# Patient Record
Sex: FEMALE | Race: WHITE | NOT HISPANIC OR LATINO | Employment: FULL TIME | URBAN - METROPOLITAN AREA
[De-identification: names, ages, dates, MRNs, and addresses within clinical notes are randomized per-mention and may not be internally consistent; named-entity substitution may affect disease eponyms.]

---

## 2017-10-06 ENCOUNTER — OFFICE VISIT (OUTPATIENT)
Dept: INTERNAL MEDICINE | Facility: CLINIC | Age: 22
End: 2017-10-06
Payer: COMMERCIAL

## 2017-10-06 ENCOUNTER — LAB VISIT (OUTPATIENT)
Dept: LAB | Facility: HOSPITAL | Age: 22
End: 2017-10-06
Attending: INTERNAL MEDICINE
Payer: COMMERCIAL

## 2017-10-06 VITALS
TEMPERATURE: 96 F | BODY MASS INDEX: 17.72 KG/M2 | HEART RATE: 57 BPM | DIASTOLIC BLOOD PRESSURE: 60 MMHG | WEIGHT: 112.88 LBS | HEIGHT: 67 IN | OXYGEN SATURATION: 96 % | SYSTOLIC BLOOD PRESSURE: 100 MMHG

## 2017-10-06 DIAGNOSIS — F50.9 EATING DISORDER: ICD-10-CM

## 2017-10-06 DIAGNOSIS — R63.6 UNDERWEIGHT: ICD-10-CM

## 2017-10-06 DIAGNOSIS — R63.1 EXCESSIVE THIRST: ICD-10-CM

## 2017-10-06 DIAGNOSIS — Z86.19 HISTORY OF LYME DISEASE: ICD-10-CM

## 2017-10-06 DIAGNOSIS — E03.9 HYPOTHYROIDISM, UNSPECIFIED TYPE: ICD-10-CM

## 2017-10-06 DIAGNOSIS — F41.9 ANXIETY: ICD-10-CM

## 2017-10-06 DIAGNOSIS — Z00.00 ROUTINE GENERAL MEDICAL EXAMINATION AT A HEALTH CARE FACILITY: ICD-10-CM

## 2017-10-06 DIAGNOSIS — Z00.00 ROUTINE GENERAL MEDICAL EXAMINATION AT A HEALTH CARE FACILITY: Primary | ICD-10-CM

## 2017-10-06 LAB
25(OH)D3+25(OH)D2 SERPL-MCNC: 35 NG/ML
ALBUMIN SERPL BCP-MCNC: 4.9 G/DL
ALP SERPL-CCNC: 62 U/L
ALT SERPL W/O P-5'-P-CCNC: 56 U/L
ANION GAP SERPL CALC-SCNC: 10 MMOL/L
AST SERPL-CCNC: 37 U/L
BASOPHILS # BLD AUTO: 0.02 K/UL
BASOPHILS NFR BLD: 0.5 %
BILIRUB SERPL-MCNC: 0.7 MG/DL
BUN SERPL-MCNC: 11 MG/DL
CALCIUM SERPL-MCNC: 10.1 MG/DL
CHLORIDE SERPL-SCNC: 94 MMOL/L
CHOLEST SERPL-MCNC: 150 MG/DL
CHOLEST/HDLC SERPL: 1.9 {RATIO}
CO2 SERPL-SCNC: 26 MMOL/L
CREAT SERPL-MCNC: 0.8 MG/DL
DIFFERENTIAL METHOD: ABNORMAL
EOSINOPHIL # BLD AUTO: 0 K/UL
EOSINOPHIL NFR BLD: 0.8 %
ERYTHROCYTE [DISTWIDTH] IN BLOOD BY AUTOMATED COUNT: 13 %
EST. GFR  (AFRICAN AMERICAN): >60 ML/MIN/1.73 M^2
EST. GFR  (NON AFRICAN AMERICAN): >60 ML/MIN/1.73 M^2
ESTIMATED AVG GLUCOSE: 97 MG/DL
FOLATE SERPL-MCNC: 9.6 NG/ML
GLUCOSE SERPL-MCNC: 78 MG/DL
HBA1C MFR BLD HPLC: 5 %
HCT VFR BLD AUTO: 39.5 %
HDLC SERPL-MCNC: 77 MG/DL
HDLC SERPL: 51.3 %
HGB BLD-MCNC: 13.2 G/DL
LDLC SERPL CALC-MCNC: 68.4 MG/DL
LYMPHOCYTES # BLD AUTO: 0.9 K/UL
LYMPHOCYTES NFR BLD: 23.8 %
MCH RBC QN AUTO: 30.8 PG
MCHC RBC AUTO-ENTMCNC: 33.4 G/DL
MCV RBC AUTO: 92 FL
MONOCYTES # BLD AUTO: 0.2 K/UL
MONOCYTES NFR BLD: 5.3 %
NEUTROPHILS # BLD AUTO: 2.6 K/UL
NEUTROPHILS NFR BLD: 69.6 %
NONHDLC SERPL-MCNC: 73 MG/DL
PLATELET # BLD AUTO: 213 K/UL
PMV BLD AUTO: 10 FL
POTASSIUM SERPL-SCNC: 4.4 MMOL/L
PROT SERPL-MCNC: 8.2 G/DL
RBC # BLD AUTO: 4.28 M/UL
SODIUM SERPL-SCNC: 130 MMOL/L
T4 FREE SERPL-MCNC: 0.86 NG/DL
TRIGL SERPL-MCNC: 23 MG/DL
TSH SERPL DL<=0.005 MIU/L-ACNC: 0.99 UIU/ML
VIT B12 SERPL-MCNC: 560 PG/ML
WBC # BLD AUTO: 3.74 K/UL

## 2017-10-06 PROCEDURE — 99385 PREV VISIT NEW AGE 18-39: CPT | Mod: S$GLB,,, | Performed by: INTERNAL MEDICINE

## 2017-10-06 PROCEDURE — 80061 LIPID PANEL: CPT

## 2017-10-06 PROCEDURE — 82746 ASSAY OF FOLIC ACID SERUM: CPT

## 2017-10-06 PROCEDURE — 82607 VITAMIN B-12: CPT

## 2017-10-06 PROCEDURE — 36415 COLL VENOUS BLD VENIPUNCTURE: CPT | Mod: PO

## 2017-10-06 PROCEDURE — 83036 HEMOGLOBIN GLYCOSYLATED A1C: CPT

## 2017-10-06 PROCEDURE — 85025 COMPLETE CBC W/AUTO DIFF WBC: CPT

## 2017-10-06 PROCEDURE — 84439 ASSAY OF FREE THYROXINE: CPT

## 2017-10-06 PROCEDURE — 84443 ASSAY THYROID STIM HORMONE: CPT

## 2017-10-06 PROCEDURE — 82306 VITAMIN D 25 HYDROXY: CPT

## 2017-10-06 PROCEDURE — 99999 PR PBB SHADOW E&M-NEW PATIENT-LVL III: CPT | Mod: PBBFAC,,, | Performed by: INTERNAL MEDICINE

## 2017-10-06 PROCEDURE — 80053 COMPREHEN METABOLIC PANEL: CPT

## 2017-10-06 RX ORDER — ACETAMINOPHEN AND PHENYLEPHRINE HCL 325; 5 MG/1; MG/1
1 TABLET ORAL DAILY
COMMUNITY

## 2017-10-06 RX ORDER — CYANOCOBALAMIN (VITAMIN B-12) 1000 MCG
1 TABLET, EXTENDED RELEASE ORAL DAILY
COMMUNITY

## 2017-10-06 RX ORDER — LIOTHYRONINE SODIUM 5 UG/1
5 TABLET ORAL DAILY
COMMUNITY
End: 2017-10-12

## 2017-10-06 RX ORDER — IBUPROFEN 100 MG/5ML
1000 SUSPENSION, ORAL (FINAL DOSE FORM) ORAL DAILY
COMMUNITY

## 2017-10-06 NOTE — PROGRESS NOTES
"Subjective:      Patient ID: Alaina Marie Abadie is a 21 y.o. female.    Chief Complaint: Establish Care    20 yo with Patient Active Problem List:     Hypothyroidism     Eating disorder  H/o lyme ds    Here today for annual exam. Declines flu vaccine. Has h/o anxiety since childhood. H/o eating d/o dx 11 or 11 y/o, orthorexia. Has had neg GYN exam. Reports h/o low thyroid tx with cytomel x 3 to 4 months. No cytomel x one week.       Review of Systems   Constitutional: Negative for chills, diaphoresis, fatigue and fever.   HENT: Negative for ear pain and sore throat.    Respiratory: Negative for cough.    Cardiovascular: Negative for chest pain.   Gastrointestinal: Negative for abdominal pain and blood in stool.   Genitourinary: Negative for dysuria and hematuria.   Neurological: Negative for seizures and syncope.   Psychiatric/Behavioral: Negative for dysphoric mood, hallucinations, self-injury and suicidal ideas. The patient is nervous/anxious.      Objective:   /60 (BP Location: Right arm, Patient Position: Sitting)   Pulse (!) 57   Temp 96.2 °F (35.7 °C) (Tympanic)   Ht 5' 7" (1.702 m)   Wt 51.2 kg (112 lb 14 oz)   LMP 01/10/2017 (Approximate)   SpO2 96%   BMI 17.68 kg/m²     Physical Exam   Constitutional: She is oriented to person, place, and time. No distress.   thin   HENT:   Head: Normocephalic and atraumatic.   Mouth/Throat: Oropharynx is clear and moist.   Eyes: EOM are normal. Pupils are equal, round, and reactive to light.   Neck: Neck supple. No thyromegaly present.   Cardiovascular: Normal rate and regular rhythm.    Pulmonary/Chest: Breath sounds normal. She has no wheezes. She has no rales.   Abdominal: Soft. Bowel sounds are normal. There is no tenderness.   Musculoskeletal: She exhibits no edema.   Lymphadenopathy:     She has no cervical adenopathy.   Neurological: She is alert and oriented to person, place, and time.   Skin: Skin is warm and dry.   Red dry hands fly   Psychiatric: " She has a normal mood and affect. Her behavior is normal.       Assessment:     1. Routine general medical examination at a health care facility    2. Hypothyroidism, unspecified type    3. Eating disorder    4. Underweight    5. Excessive thirst    6. History of Lyme disease    7. Anxiety      Plan:   Routine general medical examination at a health care facility  -     TSH; Future; Expected date: 10/06/2017  -     Lipid panel; Future; Expected date: 10/06/2017  -     CBC auto differential; Future; Expected date: 10/06/2017  -     Comprehensive metabolic panel; Future; Expected date: 10/06/2017  Healthy diet and regular exercise  Hm reviewed  Pt declined flu vaccine    Hypothyroidism, unspecified type  Check TFTs    Eating disorder  -     Vitamin D; Future; Expected date: 10/06/2017  -     Vitamin B12; Future; Expected date: 10/06/2017  -     Folate; Future; Expected date: 10/06/2017  -     Ambulatory referral to Psychiatry  -     Urinalysis; Future; Expected date: 10/06/2017    Underweight  -     Ambulatory referral to Psychiatry    Anxiety  After asking pt about red dry hands pt admits to excessive hand washing at times.    Hemoglobin A1c; Future; Expected date: 10/06/2017    Moisturize hands as discussed.     Lab Frequency Next Occurrence   TSH Once 10/06/2017   Lipid panel Once 10/06/2017   CBC auto differential Once 10/06/2017   Comprehensive metabolic panel Once 10/06/2017   Vitamin D Once 10/06/2017   Vitamin B12 Once 10/06/2017   Folate Once 10/06/2017   Urinalysis Once 10/06/2017   Hemoglobin A1c Once 10/06/2017       Problem List Items Addressed This Visit        Psychiatric    Eating disorder    Relevant Orders    Vitamin D (Completed)    Vitamin B12 (Completed)    Folate (Completed)    Ambulatory referral to Psychiatry    Urinalysis (Completed)       Endocrine    Hypothyroidism    Relevant Orders    T4, free (Completed)      Other Visit Diagnoses     Routine general medical examination at a health care  facility    -  Primary    Relevant Orders    TSH (Completed)    Lipid panel (Completed)    CBC auto differential (Completed)    Comprehensive metabolic panel (Completed)    Underweight        Relevant Orders    Ambulatory referral to Psychiatry    Excessive thirst        Relevant Orders    Hemoglobin A1c (Completed)    History of Lyme disease        Anxiety              Return in about 4 weeks (around 11/3/2017), or if symptoms worsen or fail to improve.

## 2017-10-24 ENCOUNTER — PATIENT OUTREACH (OUTPATIENT)
Dept: ADMINISTRATIVE | Facility: HOSPITAL | Age: 22
End: 2017-10-24

## 2017-11-10 ENCOUNTER — OFFICE VISIT (OUTPATIENT)
Dept: INTERNAL MEDICINE | Facility: CLINIC | Age: 22
End: 2017-11-10
Payer: COMMERCIAL

## 2017-11-10 VITALS
TEMPERATURE: 96 F | SYSTOLIC BLOOD PRESSURE: 96 MMHG | BODY MASS INDEX: 18.51 KG/M2 | HEART RATE: 47 BPM | HEIGHT: 67 IN | DIASTOLIC BLOOD PRESSURE: 64 MMHG | WEIGHT: 117.94 LBS | OXYGEN SATURATION: 99 %

## 2017-11-10 DIAGNOSIS — Z86.39 HISTORY OF HYPOTHYROIDISM: Primary | ICD-10-CM

## 2017-11-10 DIAGNOSIS — F50.9 EATING DISORDER: ICD-10-CM

## 2017-11-10 DIAGNOSIS — L30.9 DERMATITIS: ICD-10-CM

## 2017-11-10 PROCEDURE — 99213 OFFICE O/P EST LOW 20 MIN: CPT | Mod: S$GLB,,, | Performed by: INTERNAL MEDICINE

## 2017-11-10 PROCEDURE — 99999 PR PBB SHADOW E&M-EST. PATIENT-LVL III: CPT | Mod: PBBFAC,,, | Performed by: INTERNAL MEDICINE

## 2017-11-10 NOTE — PROGRESS NOTES
"Subjective:      Patient ID: Alaina Marie Abadie is a 21 y.o. female.    Chief Complaint: Follow-up    20 yo with Patient Active Problem List:     Hypothyroidism     Eating disorder    Here today for f/u on below and lab review.  Feeling well and in usual state of health. Has been seeing SW but planning to establish with psyc.       Review of Systems   Constitutional: Negative for activity change, chills, fever and unexpected weight change.   HENT: Negative for hearing loss, rhinorrhea and trouble swallowing.    Eyes: Negative for discharge and visual disturbance.   Respiratory: Negative for cough, chest tightness and wheezing.    Cardiovascular: Negative for chest pain and palpitations.   Gastrointestinal: Negative for abdominal pain, blood in stool, constipation, diarrhea and vomiting.   Endocrine: Negative for polydipsia and polyuria.   Genitourinary: Positive for menstrual problem. Negative for difficulty urinating, dysuria and hematuria.   Musculoskeletal: Negative for arthralgias, joint swelling and neck pain.   Neurological: Negative for weakness and headaches.   Psychiatric/Behavioral: Negative for confusion and dysphoric mood.     Objective:   BP 96/64 (BP Location: Right arm, Patient Position: Sitting)   Pulse (!) 47   Temp 96.3 °F (35.7 °C) (Tympanic)   Ht 5' 7" (1.702 m)   Wt 53.5 kg (117 lb 15.1 oz)   LMP  (LMP Unknown)   SpO2 99%   BMI 18.47 kg/m²     Physical Exam   Constitutional: She appears well-developed and well-nourished. No distress.   Cardiovascular: Normal rate.    Pulmonary/Chest: Effort normal.   Neurological: She is alert.   Skin: Skin is warm and dry.   Psychiatric: She has a normal mood and affect. Her behavior is normal.     No visits with results within 2 Week(s) from this visit.   Latest known visit with results is:   Lab Visit on 10/06/2017   Component Date Value Ref Range Status    TSH 10/06/2017 0.991  0.400 - 4.000 uIU/mL Final    Cholesterol 10/06/2017 150  120 - 199 " mg/dL Final    Comment: The National Cholesterol Education Program (NCEP) has set the  following guidelines (reference ranges) for Cholesterol:  Optimal.....................<200 mg/dL  Borderline High.............200-239 mg/dL  High........................> or = 240 mg/dL      Triglycerides 10/06/2017 23* 30 - 150 mg/dL Final    Comment: The National Cholesterol Education Program (NCEP) has set the  following guidelines (reference values) for triglycerides:  Normal......................<150 mg/dL  Borderline High.............150-199 mg/dL  High........................200-499 mg/dL      HDL 10/06/2017 77* 40 - 75 mg/dL Final    Comment: The National Cholesterol Education Program (NCEP) has set the  following guidelines (reference values) for HDL Cholesterol:  Low...............<40 mg/dL  Optimal...........>60 mg/dL      LDL Cholesterol 10/06/2017 68.4  63.0 - 159.0 mg/dL Final    Comment: The National Cholesterol Education Program (NCEP) has set the  following guidelines (reference values) for LDL Cholesterol:  Optimal.......................<130 mg/dL  Borderline High...............130-159 mg/dL  High..........................160-189 mg/dL  Very High.....................>190 mg/dL      HDL/Chol Ratio 10/06/2017 51.3* 20.0 - 50.0 % Final    Total Cholesterol/HDL Ratio 10/06/2017 1.9* 2.0 - 5.0 Final    Non-HDL Cholesterol 10/06/2017 73  mg/dL Final    Comment: Risk category and Non-HDL cholesterol goals:  Coronary heart disease (CHD)or equivalent (10-year risk of CHD >20%):  Non-HDL cholesterol goal     <130 mg/dL  Two or more CHD risk factors and 10-year risk of CHD <= 20%:  Non-HDL cholesterol goal     <160 mg/dL  0 to 1 CHD risk factor:  Non-HDL cholesterol goal     <190 mg/dL      WBC 10/06/2017 3.74* 3.90 - 12.70 K/uL Final    RBC 10/06/2017 4.28  4.00 - 5.40 M/uL Final    Hemoglobin 10/06/2017 13.2  12.0 - 16.0 g/dL Final    Hematocrit 10/06/2017 39.5  37.0 - 48.5 % Final    MCV 10/06/2017 92  82 - 98  fL Final    MCH 10/06/2017 30.8  27.0 - 31.0 pg Final    MCHC 10/06/2017 33.4  32.0 - 36.0 g/dL Final    RDW 10/06/2017 13.0  11.5 - 14.5 % Final    Platelets 10/06/2017 213  150 - 350 K/uL Final    MPV 10/06/2017 10.0  9.2 - 12.9 fL Final    Gran # 10/06/2017 2.6  1.8 - 7.7 K/uL Final    Lymph # 10/06/2017 0.9* 1.0 - 4.8 K/uL Final    Mono # 10/06/2017 0.2* 0.3 - 1.0 K/uL Final    Eos # 10/06/2017 0.0  0.0 - 0.5 K/uL Final    Baso # 10/06/2017 0.02  0.00 - 0.20 K/uL Final    Gran% 10/06/2017 69.6  38.0 - 73.0 % Final    Lymph% 10/06/2017 23.8  18.0 - 48.0 % Final    Mono% 10/06/2017 5.3  4.0 - 15.0 % Final    Eosinophil% 10/06/2017 0.8  0.0 - 8.0 % Final    Basophil% 10/06/2017 0.5  0.0 - 1.9 % Final    Differential Method 10/06/2017 Automated   Final    Sodium 10/06/2017 130* 136 - 145 mmol/L Final    Potassium 10/06/2017 4.4  3.5 - 5.1 mmol/L Final    Chloride 10/06/2017 94* 95 - 110 mmol/L Final    CO2 10/06/2017 26  23 - 29 mmol/L Final    Glucose 10/06/2017 78  70 - 110 mg/dL Final    BUN, Bld 10/06/2017 11  6 - 20 mg/dL Final    Creatinine 10/06/2017 0.8  0.5 - 1.4 mg/dL Final    Calcium 10/06/2017 10.1  8.7 - 10.5 mg/dL Final    Total Protein 10/06/2017 8.2  6.0 - 8.4 g/dL Final    Albumin 10/06/2017 4.9  3.5 - 5.2 g/dL Final    Total Bilirubin 10/06/2017 0.7  0.1 - 1.0 mg/dL Final    Comment: For infants and newborns, interpretation of results should be based  on gestational age, weight and in agreement with clinical  observations.  Premature Infant recommended reference ranges:  Up to 24 hours.............<8.0 mg/dL  Up to 48 hours............<12.0 mg/dL  3-5 days..................<15.0 mg/dL  6-29 days.................<15.0 mg/dL      Alkaline Phosphatase 10/06/2017 62  55 - 135 U/L Final    AST 10/06/2017 37  10 - 40 U/L Final    ALT 10/06/2017 56* 10 - 44 U/L Final    Anion Gap 10/06/2017 10  8 - 16 mmol/L Final    eGFR if African American 10/06/2017 >60.0  >60  mL/min/1.73 m^2 Final    eGFR if non African American 10/06/2017 >60.0  >60 mL/min/1.73 m^2 Final    Comment: Calculation used to obtain the estimated glomerular filtration  rate (eGFR) is the CKD-EPI equation. Since race is unknown   in our information system, the eGFR values for   -American and Non--American patients are given   for each creatinine result.      Vit D, 25-Hydroxy 10/06/2017 35  30 - 96 ng/mL Final    Comment: Vitamin D deficiency.........<10 ng/mL                              Vitamin D insufficiency......10-29 ng/mL       Vitamin D sufficiency........> or equal to 30 ng/mL  Vitamin D toxicity............>100 ng/mL      Vitamin B-12 10/06/2017 560  210 - 950 pg/mL Final    Folate 10/06/2017 9.6  4.0 - 24.0 ng/mL Final    Hemoglobin A1C 10/06/2017 5.0  4.0 - 5.6 % Final    Comment: According to ADA guidelines, hemoglobin A1c <7.0% represents  optimal control in non-pregnant diabetic patients. Different  metrics may apply to specific patient populations.   Standards of Medical Care in Diabetes-2016.  For the purpose of screening for the presence of diabetes:  <5.7%     Consistent with the absence of diabetes  5.7-6.4%  Consistent with increasing risk for diabetes   (prediabetes)  >or=6.5%  Consistent with diabetes  Currently, no consensus exists for use of hemoglobin A1c  for diagnosis of diabetes for children.  This Hemoglobin A1c assay has significant interference with fetal   hemoglobin   (HbF). The results are invalid for patients with abnormal amounts of   HbF,   including those with known Hereditary Persistence   of Fetal Hemoglobin. Heterozygous hemoglobin variants (HbAS, HbAC,   HbAD, HbAE, HbA2) do not significantly interfere with this assay;   however, presence of multiple variants in a sample may impact the %   interference.      Estimated Avg Glucose 10/06/2017 97  68 - 131 mg/dL Final    Free T4 10/06/2017 0.86  0.71 - 1.51 ng/dL Final       Assessment:     1. History  of hypothyroidism    2. Eating disorder    3. Dermatitis      Plan:   History of hypothyroidism  -     TSH; Future; Expected date: 02/10/2018  -     Cancel: T4, free; Future; Expected date: 11/24/2017  -     T4, free; Future; Expected date: 02/10/2018    Eating disorder  Comments:  continue counseling  see psyc as discussed.    Dermatitis  Comments:  improving with mosturizing hands.        Lab Frequency Next Occurrence   TSH Once 02/10/2018   T4, free Once 11/24/2017       Problem List Items Addressed This Visit        Psychiatric    Eating disorder      Other Visit Diagnoses     History of hypothyroidism    -  Primary    Relevant Orders    TSH    T4, free    Dermatitis        improving with mosturizing hands.          Return in about 1 year (around 11/10/2018), or if symptoms worsen or fail to improve.

## 2017-11-16 ENCOUNTER — PATIENT MESSAGE (OUTPATIENT)
Dept: INTERNAL MEDICINE | Facility: CLINIC | Age: 22
End: 2017-11-16

## 2018-01-19 ENCOUNTER — NURSE TRIAGE (OUTPATIENT)
Dept: ADMINISTRATIVE | Facility: CLINIC | Age: 23
End: 2018-01-19

## 2018-01-19 NOTE — TELEPHONE ENCOUNTER
"    Reason for Disposition   [1] Influenza EXPOSURE (close contact) within the last 7 days AND [2] fever or any respiratory symptoms (i.e., cough, runny or stuffy nose, sore throat)   [1] Probable influenza (fever) with no complications AND [2] NOT HIGH RISK (all triage questions negative)     Low risk patientNamrata states she began having flu symptoms Tuesday.  No difficulty breathing at rest, no chest pain, and fever low grade 100.4. Cough, sore throat, fatigue, achy, runny nose.  Said she was exposed last week, and has been home from work due to the weather, so no exposure to others this week.  Home care advice given, with request to call back for worsening symptoms, claudia difficulty breathing, fever lasting > 3 days, chest pain, or other concerns.  She states she will.  Message to Manjeet Caputo, PCP. Please contact caller directly with any additional care advice.    Answer Assessment - Initial Assessment Questions  1. 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    2. DATE of EXPOSURE: "When did the exposure occur?" (e.g., hour, days, weeks)      Monday.    3. PREGNANCY: "Is there any chance you are pregnant?" "When was your last menstrual period?"      No. N/a    4. HIGH RISK for COMPLICATIONS: "Do you have any heart or lung problems? Do you have a weakened immune system?" (e.g., CHF, COPD, asthma, HIV positive, chemotherapy, renal failure, diabetes mellitus, sickle cell anemia)      No to all.    5. SYMPTOMS: "Do you have any symptoms?" (e.g., cough, fever, sore throat, difficulty breathing).      Cough, sore throat, tired, achy, 100.0 temp.    Answer Assessment - Initial Assessment Questions  1. WORST SYMPTOM: "What is your worst symptom?" (e.g., cough, runny nose, muscle aches, headache, sore throat, fever)       Congested with cough.    2. ONSET: "When did your flu symptoms start?"     Tuesday.    3. COUGH: "How bad " "is the cough?"        Coughing with any exercise or movement.    4. RESPIRATORY DISTRESS: "Describe your breathing."       Breathing is fine.    5. FEVER: "Do you have a fever?" If so, ask: "What is your temperature, how was it measured, and when did it start?"      100.4    6. EXPOSURE: "Were you exposed to someone with influenza?"        Yes. Last week.    7. FLU VACCINE: "Did you get a flu shot this year?"      No flu shot this year.    8. HIGH RISK DISEASE: "Do you any major health problems?" (e.g., heart or lung disease, asthma, weak immune system, or other HIGH RISK conditions)      No.    9. PREGNANCY: "Is there any chance you are pregnant?" "When was your last menstrual period?"      No.    10. OTHER SYMPTOMS: "Do you have any other symptoms?"  (e.g., runny nose, muscle aches, headache, sore throat)        Runny nose, muscle aches, fatigue, sore throat, fever    Protocols used:  INFLUENZA EXPOSURE-A-,  INFLUENZA - SEASONAL-A-      "

## 2018-02-08 NOTE — PROGRESS NOTES
"Outpatient Psychiatry Initial Visit (MD/NP)    2/9/2018    Alaina Marie Abadie, a 22 y.o. female, presenting for initial evaluation visit. Met with patient.    Reason for Encounter: Patient complains of anxiety.     History of Present Illness: 21 y/o F presents for chronic anxiety, hx of compulsive behaviors, obsessive thoughts about cleanliness, health, weight, food choices. Washes hands certain number of times, performs other behaviors compulsively ("Caught in a loop". Rituals include hand washing, counting. Some distress and taking away from other activities). Describes herself as "type a perfectionistic". Seeing a counselor x 3 months. Underweight, report her PCP working with her to gain weight. But anxious in eating foods outside of safe zones. Avoids fats. Fears loss of control of eating. "Don't eat enough calories to support my exercise", acknowledges that she sometimes exercises to compensate for calories. No expectoration or purging otherwise. Started to consciously avoid foods at age 11. Has panic attacks, sometimes provoked, sometimes unprovoked but without anticipatory anxiety. Denies work functioning impairment.    Concern for routines - anxious if things are left undone; compulsive rituals.       PsychHx: anxious child, high achiever, "type a". "put a lot of pressure on myself". "worries about health". Denies problems with depression.   group therapy at [facility] for anxiety.   outpatient counselor - since October  No psych hospitalizations  No AVH, no delusions.   No SI, no self-harm behaviors.   No trauma  No sydni    FamilyHx: anxiety (aunt, grandmother). Brother with autism  Sochx: grew up in [place]. No mistreatment. Recurrent strep. No other health problems. Between extrovert. Rule-follower. Did well in school. "worrywart child", got worse as there was more to worry about.   Lives with roommate. Previously lived with friends. Gets along with others. Often alone. Moved back to . In clinical " "fellowship. End in March, gets licensure in . No relationships. Would like to have. "men don't tend to approach me". Doesn't understand why.     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     Review Of Systems:     GENERAL:  No weight gain or loss  SKIN:  No rashes or lacerations  HEAD:  No headaches  EYES:  No exophthalmos, jaundice or blindness  EARS:  No dizziness, tinnitus or hearing loss  NOSE:  No changes in smell  MOUTH & THROAT:  No dyskinetic movements or obvious goiter  CHEST:  No shortness of breath, hyperventilation or cough  CARDIOVASCULAR:  No tachycardia or chest pain  ABDOMEN:  No nausea, vomiting, pain, constipation or diarrhea  URINARY:  No frequency, dysuria or sexual dysfunction  ENDOCRINE:  No polydipsia, polyuria  MUSCULOSKELETAL:  No pain or stiffness of the joints  NEUROLOGIC:  No weakness, sensory changes, seizures, confusion, memory loss, tremor or other abnormal movements    Current Evaluation:     Nutritional Screening: Considering the patient's height and weight, medications, medical history and preferences, should a referral be made to the dietitian? no    Constitutional  Vitals:  Most recent vital signs, dated less than 90 days prior to this appointment, were not reviewed.    There were no vitals filed for this visit.     General:  unremarkable, age appropriate     Musculoskeletal  Muscle Strength/Tone:  no tremor, no tic   Gait & Station:  non-ataxic     Psychiatric  Appearance: casually dressed & groomed;   Behavior: calm,   Cooperation: cooperative with assessment  Speech: normal rate, volume, tone  Thought Process: linear, goal-directed  Thought Content: No suicidal or homicidal ideation; no delusions  Affect: anxious  Mood: anxious  Perceptions: No auditory or visual hallucinations  Level of Consciousness: alert throughout interview  Insight: fair  Cognition: Oriented to person, place, time, & " situation  Memory: no apparent deficits to general clinical interview; not formally assessed  Attention/Concentration: no apparent deficits to general clinical interview; not formally assessed  Fund of Knowledge: average by vocabulary/education    Laboratory Data  No visits with results within 1 Month(s) from this visit.   Latest known visit with results is:   Lab Visit on 10/06/2017   Component Date Value Ref Range Status    TSH 10/06/2017 0.991  0.400 - 4.000 uIU/mL Final    Cholesterol 10/06/2017 150  120 - 199 mg/dL Final    Triglycerides 10/06/2017 23* 30 - 150 mg/dL Final    HDL 10/06/2017 77* 40 - 75 mg/dL Final    LDL Cholesterol 10/06/2017 68.4  63.0 - 159.0 mg/dL Final    HDL/Chol Ratio 10/06/2017 51.3* 20.0 - 50.0 % Final    Total Cholesterol/HDL Ratio 10/06/2017 1.9* 2.0 - 5.0 Final    Non-HDL Cholesterol 10/06/2017 73  mg/dL Final    WBC 10/06/2017 3.74* 3.90 - 12.70 K/uL Final    RBC 10/06/2017 4.28  4.00 - 5.40 M/uL Final    Hemoglobin 10/06/2017 13.2  12.0 - 16.0 g/dL Final    Hematocrit 10/06/2017 39.5  37.0 - 48.5 % Final    MCV 10/06/2017 92  82 - 98 fL Final    MCH 10/06/2017 30.8  27.0 - 31.0 pg Final    MCHC 10/06/2017 33.4  32.0 - 36.0 g/dL Final    RDW 10/06/2017 13.0  11.5 - 14.5 % Final    Platelets 10/06/2017 213  150 - 350 K/uL Final    MPV 10/06/2017 10.0  9.2 - 12.9 fL Final    Gran # (ANC) 10/06/2017 2.6  1.8 - 7.7 K/uL Final    Lymph # 10/06/2017 0.9* 1.0 - 4.8 K/uL Final    Mono # 10/06/2017 0.2* 0.3 - 1.0 K/uL Final    Eos # 10/06/2017 0.0  0.0 - 0.5 K/uL Final    Baso # 10/06/2017 0.02  0.00 - 0.20 K/uL Final    Gran% 10/06/2017 69.6  38.0 - 73.0 % Final    Lymph% 10/06/2017 23.8  18.0 - 48.0 % Final    Mono% 10/06/2017 5.3  4.0 - 15.0 % Final    Eosinophil% 10/06/2017 0.8  0.0 - 8.0 % Final    Basophil% 10/06/2017 0.5  0.0 - 1.9 % Final    Differential Method 10/06/2017 Automated   Final    Sodium 10/06/2017 130* 136 - 145 mmol/L Final    Potassium  10/06/2017 4.4  3.5 - 5.1 mmol/L Final    Chloride 10/06/2017 94* 95 - 110 mmol/L Final    CO2 10/06/2017 26  23 - 29 mmol/L Final    Glucose 10/06/2017 78  70 - 110 mg/dL Final    BUN, Bld 10/06/2017 11  6 - 20 mg/dL Final    Creatinine 10/06/2017 0.8  0.5 - 1.4 mg/dL Final    Calcium 10/06/2017 10.1  8.7 - 10.5 mg/dL Final    Total Protein 10/06/2017 8.2  6.0 - 8.4 g/dL Final    Albumin 10/06/2017 4.9  3.5 - 5.2 g/dL Final    Total Bilirubin 10/06/2017 0.7  0.1 - 1.0 mg/dL Final    Alkaline Phosphatase 10/06/2017 62  55 - 135 U/L Final    AST 10/06/2017 37  10 - 40 U/L Final    ALT 10/06/2017 56* 10 - 44 U/L Final    Anion Gap 10/06/2017 10  8 - 16 mmol/L Final    eGFR if African American 10/06/2017 >60.0  >60 mL/min/1.73 m^2 Final    eGFR if non African American 10/06/2017 >60.0  >60 mL/min/1.73 m^2 Final    Vit D, 25-Hydroxy 10/06/2017 35  30 - 96 ng/mL Final    Vitamin B-12 10/06/2017 560  210 - 950 pg/mL Final    Folate 10/06/2017 9.6  4.0 - 24.0 ng/mL Final    Hemoglobin A1C 10/06/2017 5.0  4.0 - 5.6 % Final    Estimated Avg Glucose 10/06/2017 97  68 - 131 mg/dL Final    Free T4 10/06/2017 0.86  0.71 - 1.51 ng/dL Final     Medications  Outpatient Encounter Prescriptions as of 2/9/2018   Medication Sig Dispense Refill    ascorbic acid, vitamin C, (VITAMIN C) 1000 MG tablet Take 1,000 mg by mouth once daily.      cholecalciferol, vitamin D3, 5,000 unit TbDL Place 1 tablet under the tongue once daily.      selenium 200 mcg Cap Take 1 capsule by mouth once daily.       No facility-administered encounter medications on file as of 2/9/2018.      Assessment - Diagnosis - Goals:     Impression: 23 y/o with chronic anxiety, avoidant eating behaviors. Weight borderline, avoidance suggests borderline anorexia but she is quite resistant to possibility of ED though quite readily acknowledges other compulsive behaviors, thoughts as pathological.     Dx: OCD, likely anorexia    Treatment Goals:   Specify outcomes written in observable, behavioral terms:   Reduce anxiety    Treatment Plan/Recommendations:   · Discussed recommended SSRI. Patient reluctant, not ready at this point. Is working with psychotherapist.   · F/u in 3 month, consider pharmacotherapy.     Return to Clinic: 3 months    Counseling time: 10 minutes  Total time: 50 minutes    LISS Adamson MD  Psychiatry  [facility]  [address] , [place], [place] [id number] [phone]

## 2018-02-09 ENCOUNTER — OFFICE VISIT (OUTPATIENT)
Dept: PSYCHIATRY | Facility: CLINIC | Age: 23
End: 2018-02-09
Payer: COMMERCIAL

## 2018-02-09 DIAGNOSIS — F42.9 OBSESSIVE-COMPULSIVE DISORDER, UNSPECIFIED TYPE: ICD-10-CM

## 2018-02-09 DIAGNOSIS — F50.9 EATING DISORDER: Primary | ICD-10-CM

## 2018-02-09 PROCEDURE — 90792 PSYCH DIAG EVAL W/MED SRVCS: CPT | Mod: S$GLB,,, | Performed by: PSYCHIATRY & NEUROLOGY

## 2018-02-28 PROBLEM — F42.9 OCD (OBSESSIVE COMPULSIVE DISORDER): Status: ACTIVE | Noted: 2018-02-28

## 2018-04-12 NOTE — PROGRESS NOTES
"Outpatient Psychiatry Initial Visit (MD/NP)    4/13/2018    Alaina Marie Abadie, a 22 y.o. female, presenting for initial evaluation visit. Met with patient.    Reason for Encounter: Patient complains of anxiety.     Interval History: Patient seen and interviewed for follow-up. Denies new symptoms, reports symptoms are ongoing as previously: continuing compulsive behaviors, rituals, obsessive thoughts about cleanliness, health, weight, food choices, particularly in evening hours. Least problematic in the morning. Health has been ok. Has been participating in psychotherapy. Is engaged in process, feels she's learned good coping skills, finding them inadequate for symptoms.      Background: 21 y/o F presents for chronic anxiety, hx of compulsive behaviors, obsessive thoughts about cleanliness, health, weight, food choices. Washes hands certain number of times, performs other behaviors compulsively ("Caught in a loop". Rituals include hand washing, counting. Some distress and taking away from other activities). Describes herself as "type a perfectionistic". Seeing a counselor x 3 months. Underweight, report her PCP working with her to gain weight. But anxious in eating foods outside of safe zones. Avoids fats. Fears loss of control of eating. "Don't eat enough calories to support my exercise", acknowledges that she sometimes exercises to compensate for calories. No expectoration or purging otherwise. Started to consciously avoid foods at age 11. Has panic attacks, sometimes provoked, sometimes unprovoked but without anticipatory anxiety. Denies work functioning impairment. Concern for routines - anxious if things are left undone; compulsive rituals. PsychHx: anxious child, high achiever, "type a". "put a lot of pressure on myself". "worries about health". Denies problems with depression.   group therapy at [facility] for anxiety. outpatient counselor - since October. No psych hospitalizations, no AVH, no delusions, no SI, no " "self-harm behaviors, no trauma, no sydni. FamilyHx: anxiety (aunt, grandmother). Brother with autism. Sochx: grew up in [place]. No mistreatment. Recurrent strep. No other health problems. Between extrovert. Rule-follower. Did well in school. "worrywart child", got worse as there was more to worry about. Lives with roommate. Previously lived with friends. Gets along with others. Often alone. Moved back to . 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 No relationships. Would like to have. "men don't tend to approach me". Doesn't understand why. Best friend  - med interactions. Godmother  of cancer. 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     Review Of Systems:     GENERAL:  No weight gain or loss  SKIN:  No rashes or lacerations  HEAD:  No headaches  EYES:  No exophthalmos, jaundice or blindness  EARS:  No dizziness, tinnitus or hearing loss  NOSE:  No changes in smell  MOUTH & THROAT:  No dyskinetic movements or obvious goiter  CHEST:  No shortness of breath, hyperventilation or cough  CARDIOVASCULAR:  No tachycardia or chest pain  ABDOMEN:  No nausea, vomiting, pain, constipation or diarrhea  URINARY:  No frequency, dysuria or sexual dysfunction  ENDOCRINE:  No polydipsia, polyuria  MUSCULOSKELETAL:  No pain or stiffness of the joints  NEUROLOGIC:  No weakness, sensory changes, seizures, confusion, memory loss, tremor or other abnormal movements    Current Evaluation:     Nutritional Screening: Considering the patient's height and weight, medications, medical history and preferences, should a referral be made to the dietitian? no    Constitutional  Vitals:  Most recent vital signs, dated less than 90 days prior to this appointment, were not reviewed.    There were no vitals filed for this visit.     General:  unremarkable, age appropriate     Musculoskeletal  Muscle Strength/Tone:  no tremor, no tic   Gait & Station:  non-ataxic     Psychiatric  Appearance: " casually dressed & groomed;   Behavior: calm,   Cooperation: cooperative with assessment  Speech: normal rate, volume, tone  Thought Process: linear, goal-directed  Thought Content: No suicidal or homicidal ideation; no delusions  Affect: anxious  Mood: anxious  Perceptions: No auditory or visual hallucinations  Level of Consciousness: alert throughout interview  Insight: fair  Cognition: Oriented to person, place, time, & situation  Memory: no apparent deficits to general clinical interview; not formally assessed  Attention/Concentration: no apparent deficits to general clinical interview; not formally assessed  Fund of Knowledge: average by vocabulary/education    Laboratory Data  No visits with results within 1 Month(s) from this visit.   Latest known visit with results is:   Lab Visit on 10/06/2017   Component Date Value Ref Range Status    TSH 10/06/2017 0.991  0.400 - 4.000 uIU/mL Final    Cholesterol 10/06/2017 150  120 - 199 mg/dL Final    Triglycerides 10/06/2017 23* 30 - 150 mg/dL Final    HDL 10/06/2017 77* 40 - 75 mg/dL Final    LDL Cholesterol 10/06/2017 68.4  63.0 - 159.0 mg/dL Final    HDL/Chol Ratio 10/06/2017 51.3* 20.0 - 50.0 % Final    Total Cholesterol/HDL Ratio 10/06/2017 1.9* 2.0 - 5.0 Final    Non-HDL Cholesterol 10/06/2017 73  mg/dL Final    WBC 10/06/2017 3.74* 3.90 - 12.70 K/uL Final    RBC 10/06/2017 4.28  4.00 - 5.40 M/uL Final    Hemoglobin 10/06/2017 13.2  12.0 - 16.0 g/dL Final    Hematocrit 10/06/2017 39.5  37.0 - 48.5 % Final    MCV 10/06/2017 92  82 - 98 fL Final    MCH 10/06/2017 30.8  27.0 - 31.0 pg Final    MCHC 10/06/2017 33.4  32.0 - 36.0 g/dL Final    RDW 10/06/2017 13.0  11.5 - 14.5 % Final    Platelets 10/06/2017 213  150 - 350 K/uL Final    MPV 10/06/2017 10.0  9.2 - 12.9 fL Final    Gran # (ANC) 10/06/2017 2.6  1.8 - 7.7 K/uL Final    Lymph # 10/06/2017 0.9* 1.0 - 4.8 K/uL Final    Mono # 10/06/2017 0.2* 0.3 - 1.0 K/uL Final    Eos # 10/06/2017 0.0   0.0 - 0.5 K/uL Final    Baso # 10/06/2017 0.02  0.00 - 0.20 K/uL Final    Gran% 10/06/2017 69.6  38.0 - 73.0 % Final    Lymph% 10/06/2017 23.8  18.0 - 48.0 % Final    Mono% 10/06/2017 5.3  4.0 - 15.0 % Final    Eosinophil% 10/06/2017 0.8  0.0 - 8.0 % Final    Basophil% 10/06/2017 0.5  0.0 - 1.9 % Final    Differential Method 10/06/2017 Automated   Final    Sodium 10/06/2017 130* 136 - 145 mmol/L Final    Potassium 10/06/2017 4.4  3.5 - 5.1 mmol/L Final    Chloride 10/06/2017 94* 95 - 110 mmol/L Final    CO2 10/06/2017 26  23 - 29 mmol/L Final    Glucose 10/06/2017 78  70 - 110 mg/dL Final    BUN, Bld 10/06/2017 11  6 - 20 mg/dL Final    Creatinine 10/06/2017 0.8  0.5 - 1.4 mg/dL Final    Calcium 10/06/2017 10.1  8.7 - 10.5 mg/dL Final    Total Protein 10/06/2017 8.2  6.0 - 8.4 g/dL Final    Albumin 10/06/2017 4.9  3.5 - 5.2 g/dL Final    Total Bilirubin 10/06/2017 0.7  0.1 - 1.0 mg/dL Final    Alkaline Phosphatase 10/06/2017 62  55 - 135 U/L Final    AST 10/06/2017 37  10 - 40 U/L Final    ALT 10/06/2017 56* 10 - 44 U/L Final    Anion Gap 10/06/2017 10  8 - 16 mmol/L Final    eGFR if African American 10/06/2017 >60.0  >60 mL/min/1.73 m^2 Final    eGFR if non African American 10/06/2017 >60.0  >60 mL/min/1.73 m^2 Final    Vit D, 25-Hydroxy 10/06/2017 35  30 - 96 ng/mL Final    Vitamin B-12 10/06/2017 560  210 - 950 pg/mL Final    Folate 10/06/2017 9.6  4.0 - 24.0 ng/mL Final    Hemoglobin A1C 10/06/2017 5.0  4.0 - 5.6 % Final    Estimated Avg Glucose 10/06/2017 97  68 - 131 mg/dL Final    Free T4 10/06/2017 0.86  0.71 - 1.51 ng/dL Final     Medications  Outpatient Encounter Prescriptions as of 4/13/2018   Medication Sig Dispense Refill    ascorbic acid, vitamin C, (VITAMIN C) 1000 MG tablet Take 1,000 mg by mouth once daily.      cholecalciferol, vitamin D3, 5,000 unit TbDL Place 1 tablet under the tongue once daily.      selenium 200 mcg Cap Take 1 capsule by mouth once daily.        No facility-administered encounter medications on file as of 4/13/2018.      Assessment - Diagnosis - Goals:     Impression: 21 y/o with chronic anxiety, avoidant eating behaviors. Weight borderline, avoidance suggests borderline anorexia but she is quite resistant to possibility of ED though quite readily acknowledges other compulsive behaviors, thoughts as pathological.     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 Discussed risks, benefits, and alternatives to treatment plan documented above with patient. I answered all patient questions related to this plan and patient expressed understanding and agreement.   · F/u in 2 months, consider pharmacotherapy.     Return to Clinic: 2 months    Counseling time: 5 minutes  Total time: 25 minutes    LISS Adamson MD  Psychiatry  [facility]  [address] , [place] [address]  [phone]

## 2018-04-13 ENCOUNTER — OFFICE VISIT (OUTPATIENT)
Dept: PSYCHIATRY | Facility: CLINIC | Age: 23
End: 2018-04-13
Payer: COMMERCIAL

## 2018-04-13 DIAGNOSIS — F42.9 OBSESSIVE-COMPULSIVE DISORDER, UNSPECIFIED TYPE: Primary | ICD-10-CM

## 2018-04-13 PROCEDURE — 99214 OFFICE O/P EST MOD 30 MIN: CPT | Mod: S$GLB,,, | Performed by: PSYCHIATRY & NEUROLOGY

## 2018-04-13 RX ORDER — SERTRALINE HYDROCHLORIDE 50 MG/1
TABLET, FILM COATED ORAL
Qty: 60 TABLET | Refills: 2 | Status: SHIPPED | OUTPATIENT
Start: 2018-04-13

## 2018-04-20 ENCOUNTER — PATIENT MESSAGE (OUTPATIENT)
Dept: PSYCHIATRY | Facility: CLINIC | Age: 23
End: 2018-04-20

## 2018-04-25 ENCOUNTER — PATIENT MESSAGE (OUTPATIENT)
Dept: PSYCHIATRY | Facility: CLINIC | Age: 23
End: 2018-04-25